# Patient Record
Sex: MALE | Race: WHITE | URBAN - METROPOLITAN AREA
[De-identification: names, ages, dates, MRNs, and addresses within clinical notes are randomized per-mention and may not be internally consistent; named-entity substitution may affect disease eponyms.]

---

## 2019-02-17 ENCOUNTER — EMERGENCY (EMERGENCY)
Facility: HOSPITAL | Age: 24
LOS: 1 days | Discharge: HOME | End: 2019-02-17
Admitting: EMERGENCY MEDICINE
Payer: MEDICAID

## 2019-02-17 VITALS
TEMPERATURE: 99 F | DIASTOLIC BLOOD PRESSURE: 83 MMHG | OXYGEN SATURATION: 100 % | SYSTOLIC BLOOD PRESSURE: 139 MMHG | HEART RATE: 95 BPM | RESPIRATION RATE: 16 BRPM

## 2019-02-17 PROCEDURE — 99282 EMERGENCY DEPT VISIT SF MDM: CPT

## 2019-02-17 RX ORDER — TETANUS TOXOID, REDUCED DIPHTHERIA TOXOID AND ACELLULAR PERTUSSIS VACCINE, ADSORBED 5; 2.5; 8; 8; 2.5 [IU]/.5ML; [IU]/.5ML; UG/.5ML; UG/.5ML; UG/.5ML
0.5 SUSPENSION INTRAMUSCULAR ONCE
Qty: 0 | Refills: 0 | Status: DISCONTINUED | OUTPATIENT
Start: 2019-02-17 | End: 2019-02-17

## 2019-02-17 NOTE — ED PROVIDER NOTE - CLINICAL SUMMARY MEDICAL DECISION MAKING FREE TEXT BOX
22 yo M from group home here for laceration. well appearing and acting at baseline. superficial laceration. no sutures indicated at this time, wound irrigated and closed with dermabond. F/u PCP. adacel updated. 22 yo M from group home here for laceration. well appearing and acting at baseline. superficial laceration. staff member reports that if sutures are needed mother may want plastics to perform and would like to speak with provider however is OK with ER provider performing dermabond which is best for this type of wound. no sutures indicated at this time, wound irrigated and closed with dermabond. F/u PCP. adacel updated. 22 yo M from group home here for laceration. well appearing and acting at baseline. superficial laceration. staff member reports that if sutures are needed mother may want plastics to perform and would like to speak with provider however is OK with ER provider performing dermabond which is best for this type of wound. no sutures indicated at this time, wound irrigated and closed with dermabond. F/u PCP.

## 2019-02-17 NOTE — ED PROVIDER NOTE - OBJECTIVE STATEMENT
24 yo M here from group home for laceration. hit head on table and sustained a laceration. denies other injury or trauma. denies LOC. well appearing. no additional complaints. acting at baseline. denies fever chills cp sob weakness HA dizziness numbness tingling. History with help of patients aide. Unknown last tetanus. 24 yo M here from group home for laceration. hit head on table and sustained a laceration. denies other injury or trauma. denies LOC. well appearing. no additional complaints. acting at baseline. denies fever chills cp sob weakness HA dizziness numbness tingling. History with help of patients aide. staff reached out to obtain vaccine records and patient is UTD with tetanus.

## 2019-02-17 NOTE — ED ADULT TRIAGE NOTE - CHIEF COMPLAINT QUOTE
Pt brought in by group home staff, pt hit his head on a table. Pt noted to have Lac on his forehead. denies use of blood thinners.

## 2019-02-17 NOTE — ED PROVIDER NOTE - SKIN, MLM
Skin normal color for race, warm, dry and intact. No evidence of rash. + 1.5 cm VERY superficial laceration to front scalp.

## 2019-03-18 PROBLEM — Z00.00 ENCOUNTER FOR PREVENTIVE HEALTH EXAMINATION: Status: ACTIVE | Noted: 2019-03-18

## 2019-03-21 PROBLEM — M79.641 PAIN IN BOTH HANDS: Status: ACTIVE | Noted: 2019-03-21

## 2019-03-21 PROBLEM — M79.642 PAIN OF LEFT HAND: Status: ACTIVE | Noted: 2019-03-21

## 2019-03-27 ENCOUNTER — APPOINTMENT (OUTPATIENT)
Dept: ORTHOPEDIC SURGERY | Facility: HOSPITAL | Age: 24
End: 2019-03-27

## 2019-03-27 ENCOUNTER — OUTPATIENT (OUTPATIENT)
Dept: OUTPATIENT SERVICES | Facility: HOSPITAL | Age: 24
LOS: 1 days | End: 2019-03-27

## 2019-03-27 VITALS
DIASTOLIC BLOOD PRESSURE: 81 MMHG | WEIGHT: 208.45 LBS | HEART RATE: 99 BPM | HEIGHT: 72 IN | SYSTOLIC BLOOD PRESSURE: 118 MMHG | BODY MASS INDEX: 28.23 KG/M2

## 2019-03-27 DIAGNOSIS — M79.641 PAIN IN RIGHT HAND: ICD-10-CM

## 2019-03-27 DIAGNOSIS — M79.642 PAIN IN RIGHT HAND: ICD-10-CM

## 2019-03-27 DIAGNOSIS — M79.642 PAIN IN LEFT HAND: ICD-10-CM

## 2019-04-01 DIAGNOSIS — M20.022 BOUTONNIERE DEFORMITY OF LEFT FINGER(S): ICD-10-CM

## 2019-04-17 ENCOUNTER — EMERGENCY (EMERGENCY)
Facility: HOSPITAL | Age: 24
LOS: 1 days | Discharge: HOME | End: 2019-04-17
Attending: EMERGENCY MEDICINE
Payer: MEDICAID

## 2019-04-17 VITALS
OXYGEN SATURATION: 99 % | RESPIRATION RATE: 18 BRPM | DIASTOLIC BLOOD PRESSURE: 70 MMHG | SYSTOLIC BLOOD PRESSURE: 116 MMHG | TEMPERATURE: 98 F | HEART RATE: 110 BPM

## 2019-04-17 VITALS
RESPIRATION RATE: 20 BRPM | OXYGEN SATURATION: 98 % | DIASTOLIC BLOOD PRESSURE: 74 MMHG | HEART RATE: 91 BPM | SYSTOLIC BLOOD PRESSURE: 111 MMHG | TEMPERATURE: 98 F

## 2019-04-17 PROCEDURE — 90715 TDAP VACCINE 7 YRS/> IM: CPT

## 2019-04-17 PROCEDURE — 90471 IMMUNIZATION ADMIN: CPT

## 2019-04-17 PROCEDURE — 99283 EMERGENCY DEPT VISIT LOW MDM: CPT | Mod: 25

## 2019-04-17 PROCEDURE — 12011 RPR F/E/E/N/L/M 2.5 CM/<: CPT

## 2019-04-17 RX ORDER — TETANUS TOXOID, REDUCED DIPHTHERIA TOXOID AND ACELLULAR PERTUSSIS VACCINE, ADSORBED 5; 2.5; 8; 8; 2.5 [IU]/.5ML; [IU]/.5ML; UG/.5ML; UG/.5ML; UG/.5ML
0.5 SUSPENSION INTRAMUSCULAR ONCE
Qty: 0 | Refills: 0 | Status: COMPLETED | OUTPATIENT
Start: 2019-04-17 | End: 2019-04-17

## 2019-04-17 RX ADMIN — TETANUS TOXOID, REDUCED DIPHTHERIA TOXOID AND ACELLULAR PERTUSSIS VACCINE, ADSORBED 0.5 MILLILITER(S): 5; 2.5; 8; 8; 2.5 SUSPENSION INTRAMUSCULAR at 22:06

## 2019-04-17 NOTE — ED PROVIDER NOTE - NSFOLLOWUPINSTRUCTIONS_ED_ALL_ED_FT
Your wound was repaired with skin adhesive. The skin adhesive will dissolve on its own over the next 1-2 weeks.    Keep the wound dry for 24 hours, then after this you may let water run over the wound but do not scrub it. Do not apply any ointment to the wound. Observe for signs of infection including spreading redness around the wound, fevers (temperature over 101F), or discharge of pus from the area. Return to the emergency department if these occur    Return to the ED for any behavioral changes, repetitive vomiting or any other concerns.

## 2019-04-17 NOTE — ED CLERICAL - NS ED CLERK NOTE PRE-ARRIVAL INFORMATION; ADDITIONAL PRE-ARRIVAL INFORMATION
CC/Reason For referral: Head Trauma, Laceration over right eyebrow. Pt is autistic and non verbal  Preferred Consultant(if applicable): Plastic Surgery  Who admits for you (if needed): N/A  Do you have documents you would like to fax over? Yes, documents sent to ER  Would you still like to speak to an ED attending? Yes, transferred down to Red MD    Patients Devang rodriguez [phone] is the health care proxy and requests to be kept up to date with the patients status

## 2019-04-17 NOTE — ED ADULT NURSE REASSESSMENT NOTE - NS ED NURSE REASSESS COMMENT FT1
Pt resting on stretcher, in no acute distress, vital signs stable. D/C instructions with facility staff. Okay to D/C as per Dr Covarrubias.

## 2019-04-17 NOTE — ED PROVIDER NOTE - PROGRESS NOTE DETAILS
Attending MD Covarrubias: Dr. Shankar of plastics has seen patient, feels laceration does not need suture repair, skin adhesive appropriate. Will close wound with dermabond, update tetanus

## 2019-04-17 NOTE — ED ADULT NURSE NOTE - NSIMPLEMENTINTERV_GEN_ALL_ED
Implemented All Universal Safety Interventions:  [place] to call system. Call bell, personal items and telephone within reach. Instruct patient to call for assistance. Room bathroom lighting operational. Non-slip footwear when patient is off stretcher. Physically safe environment: no spills, clutter or unnecessary equipment. Stretcher in lowest position, wheels locked, appropriate side rails in place.
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
## 2019-04-17 NOTE — ED ADULT NURSE NOTE - OBJECTIVE STATEMENT
24 y/o male, with hx of behavioral problems, "might be autism" a/p rep from group home, brought to ED after referral from  for lac of right eyebrow. Group home staff reports pt had an outburst, struck head on wall.  referred to ED to have lac sutured by plastics as per pt parent request. A/P group home staff, pt is acting as usual. Present with laceration dressed with bandage of right eyebrow, waiting to remove until provider in room to decrease agitation of pt. No LOC, no other complaints, no other injuries. Pt currently calm, resting on stretcher, watching television, tapping on siderails of stretcher with fingers making music. Rep from group home at bedside. Stretcher locked/lowered.

## 2019-04-17 NOTE — ED PROVIDER NOTE - PHYSICAL EXAMINATION
Attending MD Covarrubias:    Gen:  awake and alert, follows commands, verbalizes "yes" and "no" occasionally  Neck: supple, no swelling, trachea midline, nontender spine  Head: no scalp trauma, ~1cm partial thickness laceration through middle of right eyebrow, nontender facial bones, EOMI b/l, PERRL b/l  CV: heart with reg rhythm, no obvious murmur appreciated   Resp: CTAB, breathing comfortably  Abd: soft, NT, ND  Extremities: extremities warm to the touch, no peripheral edema   Msk: no extremity deformities or bony tenderness  Pysch: follows commands  Neuro: moves all extremities spontaneously, no gross motor or sensory deficits

## 2019-04-17 NOTE — CONSULT NOTE ADULT - SUBJECTIVE AND OBJECTIVE BOX
See dictated note.  Discussed w pt's mom and Dr Covarrubias.  Plan: As per Dr Covarrubias wound to be glued by him.

## 2019-04-17 NOTE — ED PROVIDER NOTE - CLINICAL SUMMARY MEDICAL DECISION MAKING FREE TEXT BOX
Attending MD Covarrubias: 23M with autism presenting from group home with right eyebrow laceration, patient struck his head on a wall 5 hours PTA. Nonfocal neuro exam, at baseline mental status, do not suspect ICH, [place] head CT negative so no indication for CT head. Patient's mother requesting plastic surgery for repair of right eyebrow laceration, will contact for primary repair

## 2019-04-17 NOTE — ED PROVIDER NOTE - OBJECTIVE STATEMENT
23M with autism presenting from group home with facial laceration and head injury. History obtained from group home representative at bedside as patient unable to provide history due to autism. Patient per report struck his head on a wall due to behavioral outburst, no LOC reported. Patient has been behaving at baseline per staff, no emesis. The patient was seen at an urgent care center and referred to ED for further evaluation. Unknown last tetanus vaccination.

## 2019-04-21 NOTE — ED PROCEDURE NOTE - CPROC ED INFORMED CONSENT1
Benefits, risks, and possible complications of procedure explained to patient/caregiver who verbalized understanding and gave verbal consent./phone consent obtained from mother

## 2019-04-24 ENCOUNTER — APPOINTMENT (OUTPATIENT)
Dept: ORTHOPEDIC SURGERY | Facility: HOSPITAL | Age: 24
End: 2019-04-24

## 2019-04-29 ENCOUNTER — APPOINTMENT (OUTPATIENT)
Dept: ORTHOPEDIC SURGERY | Facility: CLINIC | Age: 24
End: 2019-04-29

## 2019-05-03 PROBLEM — F84.0 AUTISTIC DISORDER: Chronic | Status: ACTIVE | Noted: 2019-04-21

## 2019-05-08 ENCOUNTER — OUTPATIENT (OUTPATIENT)
Dept: OUTPATIENT SERVICES | Facility: HOSPITAL | Age: 24
LOS: 1 days | End: 2019-05-08

## 2019-05-08 ENCOUNTER — APPOINTMENT (OUTPATIENT)
Dept: ORTHOPEDIC SURGERY | Facility: HOSPITAL | Age: 24
End: 2019-05-08

## 2019-05-08 VITALS
HEIGHT: 73 IN | DIASTOLIC BLOOD PRESSURE: 85 MMHG | HEART RATE: 97 BPM | BODY MASS INDEX: 26.11 KG/M2 | SYSTOLIC BLOOD PRESSURE: 144 MMHG | WEIGHT: 197 LBS

## 2019-05-08 DIAGNOSIS — S62.91XA UNSPECIFIED FRACTURE OF RIGHT WRIST AND HAND, INITIAL ENCOUNTER FOR CLOSED FRACTURE: ICD-10-CM

## 2019-05-09 DIAGNOSIS — S62.352A NONDISPLACED FRACTURE OF SHAFT OF THIRD METACARPAL BONE, RIGHT HAND, INITIAL ENCOUNTER FOR CLOSED FRACTURE: ICD-10-CM

## 2019-05-09 DIAGNOSIS — M20.022 BOUTONNIERE DEFORMITY OF LEFT FINGER(S): ICD-10-CM

## 2019-05-15 ENCOUNTER — OTHER (OUTPATIENT)
Age: 24
End: 2019-05-15

## 2019-05-15 ENCOUNTER — APPOINTMENT (OUTPATIENT)
Dept: ORTHOPEDIC SURGERY | Facility: HOSPITAL | Age: 24
End: 2019-05-15

## 2019-05-15 ENCOUNTER — APPOINTMENT (OUTPATIENT)
Dept: RADIOLOGY | Facility: HOSPITAL | Age: 24
End: 2019-05-15

## 2019-05-15 ENCOUNTER — OUTPATIENT (OUTPATIENT)
Dept: OUTPATIENT SERVICES | Facility: HOSPITAL | Age: 24
LOS: 1 days | End: 2019-05-15
Payer: MEDICAID

## 2019-05-15 VITALS
DIASTOLIC BLOOD PRESSURE: 98 MMHG | WEIGHT: 199 LBS | HEART RATE: 91 BPM | BODY MASS INDEX: 26.37 KG/M2 | SYSTOLIC BLOOD PRESSURE: 123 MMHG | HEIGHT: 73 IN

## 2019-05-15 PROCEDURE — 73130 X-RAY EXAM OF HAND: CPT | Mod: 26,RT

## 2019-05-16 DIAGNOSIS — S62.352D NONDISPLACED FRACTURE OF SHAFT OF THIRD METACARPAL BONE, RIGHT HAND, SUBSEQUENT ENCOUNTER FOR FRACTURE WITH ROUTINE HEALING: ICD-10-CM

## 2019-05-16 DIAGNOSIS — M20.022 BOUTONNIERE DEFORMITY OF LEFT FINGER(S): ICD-10-CM

## 2019-05-19 ENCOUNTER — EMERGENCY (EMERGENCY)
Facility: HOSPITAL | Age: 24
LOS: 1 days | Discharge: HOME | End: 2019-05-19
Attending: EMERGENCY MEDICINE | Admitting: EMERGENCY MEDICINE
Payer: MEDICAID

## 2019-05-19 VITALS
RESPIRATION RATE: 16 BRPM | OXYGEN SATURATION: 100 % | TEMPERATURE: 98 F | DIASTOLIC BLOOD PRESSURE: 72 MMHG | HEART RATE: 88 BPM | SYSTOLIC BLOOD PRESSURE: 118 MMHG

## 2019-05-19 VITALS
TEMPERATURE: 98 F | OXYGEN SATURATION: 98 % | RESPIRATION RATE: 20 BRPM | SYSTOLIC BLOOD PRESSURE: 136 MMHG | HEART RATE: 135 BPM | DIASTOLIC BLOOD PRESSURE: 89 MMHG

## 2019-05-19 PROCEDURE — 73130 X-RAY EXAM OF HAND: CPT | Mod: 26,RT,76

## 2019-05-19 PROCEDURE — 99283 EMERGENCY DEPT VISIT LOW MDM: CPT

## 2019-05-19 NOTE — CONSULT NOTE ADULT - SUBJECTIVE AND OBJECTIVE BOX
HPI: 23y year old Male w/ hx of autism, well known to orthopaedic service approximately 2 weeks after sustaining a R 3rd metacarpal s/p unwitnessed trauma. Patient was seen in clinic with Dr. Lee and was placed into a short arm gutter cast by myself since he had been able to brake apart most of the cast that was put on him the week previously. He is brought back in today because he "gnawed off" the top of his cast.    PMH:  Autism    PSH:  No significant past surgical history    Allergies:  No Known Allergies    O:  T(C): 36.8 (05-19-19 @ 15:44), Max: 36.8 (05-19-19 @ 15:44)  HR: 135 (05-19-19 @ 15:44) (135 - 135)  BP: 136/89 (05-19-19 @ 15:44) (136/89 - 136/89)  RR: 20 (05-19-19 @ 15:44) (20 - 20)  SpO2: 98% (05-19-19 @ 15:44) (98% - 98%)    Gen  RUE  skin intact  top of cast missing, bottom part dangling around forearm  remainder of cast removed with cast saw  minimally TTP over 3rd MC, no apparent deformity  AIN/PIN/U Intact  SILT M/U/R  Radial pulse palpable, WWP distally    Imaging: XR of the R hand show maintained alignment of 3rd MC fx    Patient was placed in new short arm gutter cast. Post reduction x-rays reviewed with maintained alignment.    A/P 23y year old man with subacute R 3rd MC fx, gnawed off his cast and placed in new one today    Pain Control  NWB RUE  Sling for comfort  F/u as outpatient with Dr. Lee in 2.5 weeks as planned    Patient discussed with Dr. Jesus Rod MD

## 2019-05-19 NOTE — ED PROVIDER NOTE - PROGRESS NOTE DETAILS
post splint xr reviewed by ortho, stable for dc w/ f/u in ortho clinic -Sebastian, pgy2 Pt. with 3rd MC fx, repeat immobilization in ED, post splint xr reviewed by ortho, stable for dc w/ f/u in ortho clinic -Sebastian, pgy2

## 2019-05-19 NOTE — ED PROVIDER NOTE - OBJECTIVE STATEMENT
23M hx autism/intellectual disability p/w bilateral hand pain. Allegedly pt smacks extremities against objects at baseline, often hands/fingers, partakes in these behaviors more when agitated per aid at bedside pt was dx'd w/ R 4th digit fx in last week, digit was placed in a splint but since then pt has knocked the splint off in doing his usual thrashing behavior. On exam pt is barely verbal but does endorse bilateral finger/hand pain. Not acutely agitated. 23M hx autism/intellectual disability p/w bilateral hand pain. Allegedly pt smacks extremities against objects at baseline, often hands/fingers, partakes in these behaviors more when agitated per aid at bedside pt was dx'd w/ R 3rd digit fx in last week, digit was placed in a splint but since then pt has knocked the splint off in doing his usual thrashing behavior. On exam pt is barely verbal but does endorse bilateral finger/hand pain. Not acutely agitated.

## 2019-05-19 NOTE — ED PROVIDER NOTE - ATTENDING CONTRIBUTION TO CARE
Seen and examined, pt. NAD at time of exam, alert, smiling and baseline mental status per aide. Pt. with recent dx of finger fx and bit into casting material, also banged both hands into chair/bed/wall as he sometimes does. Pt. not currently c/o pain but reportedly was holding R hand as if it were injured and now may have been doing the same with L hand. Per aide parent was concerned re: bilat hand injuries. No other potential injuries or c/o reported. Clear lungs, heart reg, abd soft, NT to palp, RUE with partial cast material, full circumference to distal forearm, damaged distally, hand and fingers exposed, no edema/ecchymosis and full ROM. LUE ranges all and NT to palp.

## 2019-05-19 NOTE — ED ADULT NURSE NOTE - OBJECTIVE STATEMENT
Coming from group home with staff at bedside. Pt. c/o pain to right 4th finger. As per staff, pt. constantly moving hands around hitting things. xray done prior to arrival showing fx to right 4th digit. Hand was casted but pt. took it off. Sent for recasting and xray of left hand. As per mother, pt. has been complaining of pain to left hand. Denies any pain right now. Able to move and bend all fingers with full sensation. Pt. behavior normal for him. MD at bedside for eval. Awaiting xrays. Will continue to monitor.

## 2019-05-19 NOTE — ED ADULT NURSE NOTE - NSIMPLEMENTINTERV_GEN_ALL_ED
Implemented All Universal Safety Interventions:  [place] to call system. Call bell, personal items and telephone within reach. Instruct patient to call for assistance. Room bathroom lighting operational. Non-slip footwear when patient is off stretcher. Physically safe environment: no spills, clutter or unnecessary equipment. Stretcher in lowest position, wheels locked, appropriate side rails in place.

## 2019-05-19 NOTE — ED PROVIDER NOTE - CLINICAL SUMMARY MEDICAL DECISION MAKING FREE TEXT BOX
23M hx austism p/w bilateral hand pain in setting of recent R fourth digit fracture. Cooperative on exam with no gross deforrmities but digital tenderness to palpation bilaterally. Plan - bilateral hand xrays and re-splinting, will sedate if pt becomes agitated.

## 2019-05-19 NOTE — ED ADULT TRIAGE NOTE - CHIEF COMPLAINT QUOTE
Pt autistic from group home c/o finger injury. Pt noted to have half cast on RT arm. As per aid pt bangs fingers and injured finger on rt hand. No visible bruising or swelling noted to fingers. As per aid pt's mother would like x-ray of left arm as well because of possible injury. Pt able to move both extremities.

## 2019-05-27 ENCOUNTER — EMERGENCY (EMERGENCY)
Facility: HOSPITAL | Age: 24
LOS: 1 days | Discharge: HOME | End: 2019-05-27
Admitting: EMERGENCY MEDICINE
Payer: MEDICAID

## 2019-05-27 VITALS
SYSTOLIC BLOOD PRESSURE: 110 MMHG | HEART RATE: 94 BPM | TEMPERATURE: 99 F | DIASTOLIC BLOOD PRESSURE: 61 MMHG | RESPIRATION RATE: 18 BRPM | OXYGEN SATURATION: 99 %

## 2019-05-27 PROCEDURE — 99283 EMERGENCY DEPT VISIT LOW MDM: CPT

## 2019-05-27 RX ORDER — HYDROCORTISONE 1 %
1 OINTMENT (GRAM) TOPICAL
Qty: 1 | Refills: 0
Start: 2019-05-27 | End: 2019-06-02

## 2019-05-27 RX ORDER — HYDROCORTISONE 1 %
1 OINTMENT (GRAM) TOPICAL
Qty: 1 | Refills: 0
Start: 2019-05-27

## 2019-05-27 NOTE — ED PROVIDER NOTE - NSFOLLOWUPINSTRUCTIONS_ED_ALL_ED_FT
Please apply hydrocortisone cream to affected areas as prescribed.  Please follow up in orthopedic clinic in 1-2 days.  If any new, worsened or concerning symptoms, please return to the ED immediately.

## 2019-05-27 NOTE — ED PROVIDER NOTE - PHYSICAL EXAMINATION
Right forearm/arm: Short arm gutter cast noted in place, no forearm or hand swelling noted. Cast loose, I am able to slide 3 of my fingers under it, right at the edge where the cast ends distal forearm there is an area of erythema, dry and scaly which does not extend beneath the rest of the casr, no warmth, radial pulse 2+, cap refill <2sec, sensation to light touch intact, pt is able to move all fingers without difficulties

## 2019-05-27 NOTE — ED PROVIDER NOTE - OBJECTIVE STATEMENT
23 year old male with PMH of Autism presents to the ED accompanied by group home staff member complaining of skin redness around the cast for a week. Pt has a short arm gutter cast in place for nearly 3 weeks for 3rd metacarpal fracture. Pt nodded to questions about pain, weakness, numbness or tingling sensation. No fever or chills.

## 2019-05-27 NOTE — ED PROVIDER NOTE - CLINICAL SUMMARY MEDICAL DECISION MAKING FREE TEXT BOX
23 year old male with contact cellulitis of the right forearm due to short arm gutter cast in place; cast is loose, neurosensory exam intact; no clinical evidence of acute compartment synd, cellulitis, or nec fasc. will Rx Steroids and advise follow up in ortho clinic in 1-2 days. Return precautions.

## 2019-05-30 ENCOUNTER — EMERGENCY (EMERGENCY)
Facility: HOSPITAL | Age: 24
LOS: 1 days | Discharge: HOME | End: 2019-05-30
Attending: EMERGENCY MEDICINE | Admitting: EMERGENCY MEDICINE
Payer: MEDICAID

## 2019-05-30 VITALS
TEMPERATURE: 99 F | RESPIRATION RATE: 18 BRPM | HEART RATE: 97 BPM | DIASTOLIC BLOOD PRESSURE: 88 MMHG | OXYGEN SATURATION: 97 % | SYSTOLIC BLOOD PRESSURE: 114 MMHG

## 2019-05-30 PROCEDURE — 73130 X-RAY EXAM OF HAND: CPT | Mod: 26,RT

## 2019-05-30 PROCEDURE — 99283 EMERGENCY DEPT VISIT LOW MDM: CPT

## 2019-05-30 RX ORDER — CLONAZEPAM 1 MG
1 TABLET ORAL ONCE
Refills: 0 | Status: DISCONTINUED | OUTPATIENT
Start: 2019-05-30 | End: 2019-05-30

## 2019-05-30 RX ORDER — CLONAZEPAM 1 MG
0.5 TABLET ORAL ONCE
Refills: 0 | Status: DISCONTINUED | OUTPATIENT
Start: 2019-05-30 | End: 2019-05-30

## 2019-05-30 RX ADMIN — Medication 1 MILLIGRAM(S): at 22:37

## 2019-05-30 RX ADMIN — Medication 1 MILLIGRAM(S): at 20:55

## 2019-05-30 RX ADMIN — Medication 0.5 MILLIGRAM(S): at 20:55

## 2019-05-30 NOTE — ED PROVIDER NOTE - PROGRESS NOTE DETAILS
pt becoming more agitated. repaged ortho to obtain ETA for re-casting. ortho resident says he's coming after seeing pt in [place] next [place]OTT [place]: Pt seen by ortho, re-casted, post procedure imaging fine per ortho, can d/c has appointment for Wednesday 6/05 for ortho clinic.

## 2019-05-30 NOTE — ED PROVIDER NOTE - NSFOLLOWUPINSTRUCTIONS_ED_ALL_ED_FT
Follow up in ortho clinic on 6/05, you have an appointment scheduled  Keep cast in place  Return to the ER with any worsening or concerning symptoms, pain, swelling, redness, numbness/tingling or any other concerns.

## 2019-05-30 NOTE — ED PROVIDER NOTE - ATTENDING CONTRIBUTION TO CARE
Patient is a 24 yo M with history of autism here for evaluation of his right hand. Patient has a hx of right hand 3rd MCP fracture s/p cast ~ 3 weeks ago, here for a 2nd time for reassessment because he tries to get his cast off and bangs his hand. Patient states he has pain in his right hand. Per caregiver, no other issues including fevers, vomiting. Patient is supposed to have his cast off next week.    VS noted  Gen. no acute distress, Non toxic   HEENT: EOMI, mmm  Lungs: CTAB/L no C/ W /R   CVS: RRR   Abd; Soft non tender, non distended   Ext: right hand: partial cast has been chipped away at, fingers visible, normal cap refill, moving fingers  Skin: no rash  Neuro awake, alert, non focal  a/p: right 3rd MCP fx - here for cast reassessment. Will XR and consult ortho - as needs to be recasted.   - Cecil HONEYCUTT

## 2019-05-30 NOTE — ED PROVIDER NOTE - CLINICAL SUMMARY MEDICAL DECISION MAKING FREE TEXT BOX
23M hx autism & intellectual disability p/w broken R short arm gutter cast placed by orthopedics on 5/19. Hand not acutely swollen compared to L. WIll re-xray and call ortho once films back.

## 2019-05-30 NOTE — ED PROVIDER NOTE - CARE PLAN
Principal Discharge DX:	Metacarpal bone fracture  Assessment and plan of treatment:	right hand 3rd metacarpal bone fracture, closed, recasted.

## 2019-05-30 NOTE — ED ADULT TRIAGE NOTE - CHIEF COMPLAINT QUOTE
Patient coming from Group Home ([facility]) with staff member. RN at facility requesting to have right arm cast "re-casted." Patient able to follow simple commands. Patient coming from [facility] ([facility]) with staff member. RN at facility requesting to have right arm cast "re-casted." Patient able to follow simple commands. PMH Autism

## 2019-05-30 NOTE — ED PROVIDER NOTE - OBJECTIVE STATEMENT
23M hx autism/intellectual disability p/w because "he needs to be re-casted" 2/2 pt picking at and banging his casted R hand which is known to have a 3rd MCP fx. Allegedly pt smacks extremities against objects at baseline, often hands/fingers, partakes in these behaviors more when agitated per aid at bedside. pt has knocked the splint off in doing his usual thrashing behavior. On exam pt is barely verbal but does endorse bilateral finger/hand pain. Not acutely agitated.

## 2019-05-31 PROCEDURE — 73130 X-RAY EXAM OF HAND: CPT | Mod: 26,RT

## 2019-05-31 PROCEDURE — 73090 X-RAY EXAM OF FOREARM: CPT | Mod: 26,RT

## 2019-05-31 NOTE — CONSULT NOTE ADULT - SUBJECTIVE AND OBJECTIVE BOX
CC: Bit of R hand cast  HPI: 23y year old Male w/ hx of autism, well known to orthopaedic service approximately 4 weeks after sustaining a R 3rd metacarpal fx s/p unwitnessed trauma. Patient was seen in clinic with Dr. Lee and was placed into a short arm gutter cast by myself, he broke/bit apart most of the nicolás and was recasted on 5/19. He is brought back in today because he again "gnawed off" the top of his cast.    Review of systems: As per HPI, otherwise negative.     PAST MEDICAL & SURGICAL HISTORY:  Autism  No significant past surgical history    Family History: FAMILY HISTORY:  Non-contributory    T(C): 37.1 (05-30-19 @ 17:15), Max: 37.1 (05-30-19 @ 17:15)  HR: 97 (05-30-19 @ 17:15) (97 - 97)  BP: 114/88 (05-30-19 @ 17:15) (114/88 - 114/88)  RR: 18 (05-30-19 @ 17:15) (18 - 18)  SpO2: 97% (05-30-19 @ 17:15) (97% - 97%)    Gen  RUE  skin intact  top of cast missing, bottom part around forearm  remainder of cast removed with cast saw  non TTP over 3rd MC, no apparent deformity  AIN/PIN/U Intact  SILT M/U/R  Radial pulse palpable, WWP distally    Imaging: XR of the R hand show maintained alignment of 3rd MC fx    Patient was placed in new short arm gutter cast. Post reduction x-rays reviewed with maintained alignment.    A/P 23y year old man with subacute R 3rd MC fx, gnawed off his cast and placed in new one today    Pain Control  NWB RUE  Sling for comfort  F/u as outpatient with Dr. Lee next Wednesday as planned    Patient discussed with Dr. Jesus Rod MD

## 2019-06-05 ENCOUNTER — APPOINTMENT (OUTPATIENT)
Dept: ORTHOPEDIC SURGERY | Facility: HOSPITAL | Age: 24
End: 2019-06-05

## 2019-06-05 ENCOUNTER — OUTPATIENT (OUTPATIENT)
Dept: OUTPATIENT SERVICES | Facility: HOSPITAL | Age: 24
LOS: 1 days | End: 2019-06-05

## 2019-06-05 VITALS — HEART RATE: 108 BPM | DIASTOLIC BLOOD PRESSURE: 60 MMHG | SYSTOLIC BLOOD PRESSURE: 140 MMHG | WEIGHT: 192 LBS

## 2019-06-11 ENCOUNTER — APPOINTMENT (OUTPATIENT)
Dept: ORTHOPEDIC SURGERY | Facility: CLINIC | Age: 24
End: 2019-06-11
Payer: MEDICAID

## 2019-06-11 VITALS
HEART RATE: 88 BPM | SYSTOLIC BLOOD PRESSURE: 108 MMHG | DIASTOLIC BLOOD PRESSURE: 70 MMHG | BODY MASS INDEX: 25.45 KG/M2 | HEIGHT: 73 IN | WEIGHT: 192 LBS

## 2019-06-11 DIAGNOSIS — S62.352D NONDISPLACED FRACTURE OF SHAFT OF THIRD METACARPAL BONE, RIGHT HAND, SUBSEQUENT ENCOUNTER FOR FRACTURE WITH ROUTINE HEALING: ICD-10-CM

## 2019-06-11 DIAGNOSIS — Z78.9 OTHER SPECIFIED HEALTH STATUS: ICD-10-CM

## 2019-06-11 DIAGNOSIS — M20.022 BOUTONNIERE DEFORMITY OF LEFT FINGER(S): ICD-10-CM

## 2019-06-11 DIAGNOSIS — Z86.59 PERSONAL HISTORY OF OTHER MENTAL AND BEHAVIORAL DISORDERS: ICD-10-CM

## 2019-06-11 PROCEDURE — 99214 OFFICE O/P EST MOD 30 MIN: CPT

## 2019-06-11 RX ORDER — CLONAZEPAM 1 MG/1
1 TABLET ORAL
Refills: 0 | Status: ACTIVE | COMMUNITY

## 2019-07-01 ENCOUNTER — EMERGENCY (EMERGENCY)
Facility: HOSPITAL | Age: 24
LOS: 1 days | Discharge: HOME | End: 2019-07-01
Attending: EMERGENCY MEDICINE | Admitting: EMERGENCY MEDICINE
Payer: MEDICAID

## 2019-07-01 VITALS
DIASTOLIC BLOOD PRESSURE: 71 MMHG | TEMPERATURE: 99 F | HEART RATE: 95 BPM | SYSTOLIC BLOOD PRESSURE: 141 MMHG | RESPIRATION RATE: 16 BRPM | OXYGEN SATURATION: 98 %

## 2019-07-01 PROCEDURE — 99282 EMERGENCY DEPT VISIT SF MDM: CPT

## 2019-07-01 NOTE — ED PROVIDER NOTE - CLINICAL SUMMARY MEDICAL DECISION MAKING FREE TEXT BOX
24 year-old male with history of autism/intellectual disability with baseline trashing behavior; 1.5cm superficial laceration - not gaping.  No need for closure at this time.  Bacitracin and outpatient follow-up.
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
## 2019-07-01 NOTE — ED PROVIDER NOTE - PHYSICAL EXAMINATION
*Gen: NAD, inappropriate for situation (baseline)  *HEENT: NC, small 1-2 cm hematoma on the superior frontal aspect of head, MMM, airway patent, trachea midline  *CV: RRR, S1/S2 present, no murmurs/rubs  *Resp: no respiratory distress, LCTAB, no wheezing/rales  *Abd: non-distended, soft N/Tx4, no guarding or rigidity  *Neuro: no focal neuro deficits, moving all limbs appropriately  *Extremities: no gross deformity  *Skin: no rashes, superficial 1.5cm laceration on superior frontal aspect of head - not gaping  ~ Dave Johnson M.D.
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
## 2019-07-01 NOTE — ED PROVIDER NOTE - NSFOLLOWUPINSTRUCTIONS_ED_ALL_ED_FT
Follow-up with your Primary Care Physician within 2 - 5 days or as needed.  Please return to the Emergency Department immediately for any new, worsening or concerning symptoms.    Can use Tylenol or Ibuprofen as per package directions for pain - use only as needed.  These are over-the-counter medications - please respect the warnings on the label.     Can apply antibiotic ointment over the wound daily.  Keep wound clean and dry wound, apply a thin layer of antibiotic ointment and check for any signs/symptoms of infection (drainage from site, redness, red streaking, and/or new, worsening or concerning symptoms).

## 2019-07-17 ENCOUNTER — APPOINTMENT (OUTPATIENT)
Dept: ORTHOPEDIC SURGERY | Facility: HOSPITAL | Age: 24
End: 2019-07-17

## 2019-09-27 ENCOUNTER — EMERGENCY (EMERGENCY)
Facility: HOSPITAL | Age: 24
LOS: 1 days | Discharge: HOME | End: 2019-09-27
Attending: EMERGENCY MEDICINE | Admitting: EMERGENCY MEDICINE
Payer: MEDICAID

## 2019-09-27 VITALS
OXYGEN SATURATION: 100 % | RESPIRATION RATE: 16 BRPM | SYSTOLIC BLOOD PRESSURE: 111 MMHG | DIASTOLIC BLOOD PRESSURE: 62 MMHG | HEART RATE: 98 BPM | TEMPERATURE: 98 F

## 2019-09-27 VITALS
HEART RATE: 82 BPM | SYSTOLIC BLOOD PRESSURE: 115 MMHG | OXYGEN SATURATION: 100 % | TEMPERATURE: 98 F | DIASTOLIC BLOOD PRESSURE: 58 MMHG | RESPIRATION RATE: 18 BRPM

## 2019-09-27 LAB
ALBUMIN SERPL ELPH-MCNC: 4.3 G/DL — SIGNIFICANT CHANGE UP (ref 3.3–5)
ALP SERPL-CCNC: 60 U/L — SIGNIFICANT CHANGE UP (ref 40–120)
ALT FLD-CCNC: 9 U/L — SIGNIFICANT CHANGE UP (ref 4–41)
ANION GAP SERPL CALC-SCNC: 14 MMO/L — SIGNIFICANT CHANGE UP (ref 7–14)
APAP SERPL-MCNC: < 15 UG/ML — LOW (ref 15–25)
AST SERPL-CCNC: 21 U/L — SIGNIFICANT CHANGE UP (ref 4–40)
BASOPHILS # BLD AUTO: 0.02 K/UL — SIGNIFICANT CHANGE UP (ref 0–0.2)
BASOPHILS NFR BLD AUTO: 0.3 % — SIGNIFICANT CHANGE UP (ref 0–2)
BILIRUB SERPL-MCNC: 0.5 MG/DL — SIGNIFICANT CHANGE UP (ref 0.2–1.2)
BUN SERPL-MCNC: 12 MG/DL — SIGNIFICANT CHANGE UP (ref 7–23)
CALCIUM SERPL-MCNC: 9.3 MG/DL — SIGNIFICANT CHANGE UP (ref 8.4–10.5)
CHLORIDE SERPL-SCNC: 104 MMOL/L — SIGNIFICANT CHANGE UP (ref 98–107)
CO2 SERPL-SCNC: 22 MMOL/L — SIGNIFICANT CHANGE UP (ref 22–31)
CREAT SERPL-MCNC: 1.08 MG/DL — SIGNIFICANT CHANGE UP (ref 0.5–1.3)
EOSINOPHIL # BLD AUTO: 0.05 K/UL — SIGNIFICANT CHANGE UP (ref 0–0.5)
EOSINOPHIL NFR BLD AUTO: 0.9 % — SIGNIFICANT CHANGE UP (ref 0–6)
ETHANOL BLD-MCNC: < 10 MG/DL — SIGNIFICANT CHANGE UP
GLUCOSE SERPL-MCNC: 69 MG/DL — LOW (ref 70–99)
HCT VFR BLD CALC: 48.7 % — SIGNIFICANT CHANGE UP (ref 39–50)
HGB BLD-MCNC: 15.8 G/DL — SIGNIFICANT CHANGE UP (ref 13–17)
IMM GRANULOCYTES NFR BLD AUTO: 0.3 % — SIGNIFICANT CHANGE UP (ref 0–1.5)
LYMPHOCYTES # BLD AUTO: 1.79 K/UL — SIGNIFICANT CHANGE UP (ref 1–3.3)
LYMPHOCYTES # BLD AUTO: 30.4 % — SIGNIFICANT CHANGE UP (ref 13–44)
MCHC RBC-ENTMCNC: 28.2 PG — SIGNIFICANT CHANGE UP (ref 27–34)
MCHC RBC-ENTMCNC: 32.4 % — SIGNIFICANT CHANGE UP (ref 32–36)
MCV RBC AUTO: 87 FL — SIGNIFICANT CHANGE UP (ref 80–100)
MONOCYTES # BLD AUTO: 0.67 K/UL — SIGNIFICANT CHANGE UP (ref 0–0.9)
MONOCYTES NFR BLD AUTO: 11.4 % — SIGNIFICANT CHANGE UP (ref 2–14)
NEUTROPHILS # BLD AUTO: 3.33 K/UL — SIGNIFICANT CHANGE UP (ref 1.8–7.4)
NEUTROPHILS NFR BLD AUTO: 56.7 % — SIGNIFICANT CHANGE UP (ref 43–77)
NRBC # FLD: 0 K/UL — SIGNIFICANT CHANGE UP (ref 0–0)
PLATELET # BLD AUTO: 174 K/UL — SIGNIFICANT CHANGE UP (ref 150–400)
PMV BLD: 12.1 FL — SIGNIFICANT CHANGE UP (ref 7–13)
POTASSIUM SERPL-MCNC: 4.2 MMOL/L — SIGNIFICANT CHANGE UP (ref 3.5–5.3)
POTASSIUM SERPL-SCNC: 4.2 MMOL/L — SIGNIFICANT CHANGE UP (ref 3.5–5.3)
PROT SERPL-MCNC: 7.5 G/DL — SIGNIFICANT CHANGE UP (ref 6–8.3)
RBC # BLD: 5.6 M/UL — SIGNIFICANT CHANGE UP (ref 4.2–5.8)
RBC # FLD: 12.8 % — SIGNIFICANT CHANGE UP (ref 10.3–14.5)
SALICYLATES SERPL-MCNC: < 5 MG/DL — LOW (ref 15–30)
SODIUM SERPL-SCNC: 140 MMOL/L — SIGNIFICANT CHANGE UP (ref 135–145)
WBC # BLD: 5.88 K/UL — SIGNIFICANT CHANGE UP (ref 3.8–10.5)
WBC # FLD AUTO: 5.88 K/UL — SIGNIFICANT CHANGE UP (ref 3.8–10.5)

## 2019-09-27 PROCEDURE — 99284 EMERGENCY DEPT VISIT MOD MDM: CPT

## 2019-09-27 NOTE — ED ADULT NURSE NOTE - OBJECTIVE STATEMENT
received pt A&Ox1 in no apparent distress at this time. #20g IVL to R FA, bloods drawn and sent to the lab. no s/s of infiltration noted at this time. staff and MD at bedside. pt calm and cooperative at this time. vss. cardiac monitor in place as per MD order. dispo pending.

## 2019-09-27 NOTE — ED PROVIDER NOTE - PATIENT PORTAL LINK FT
You can access the FollowMyHealth Patient Portal offered by [facility] by registering at the following website: [email or web address]. By joining [employer]’s FollowMyHealth portal, you will also be able to view your health information using other applications (apps) compatible with our system.

## 2019-09-27 NOTE — ED PROVIDER NOTE - OBJECTIVE STATEMENT
24y male with PMH of autism presenting with possible clonazepam overdose. History from aide, patient was found in med room at his group home with an empty bottle of clonazepam, patient stated both that he put them down the drain and that he had taken them. No other medications were noted to be missing. There were approximately 22 pills of 1mg clonazepam in the bottle. Possible ingestion occurred at approximately 6:30am. Aide states that patient appears to be at baseline. Patient answering yes to all questions, unable to get clear history from patient, when asked if he took the meds he states yes.

## 2019-09-27 NOTE — ED ADULT NURSE NOTE - CHIEF COMPLAINT QUOTE
patient brought in by  ([facility] ) stating that " patient went in to med room and got hold of Klonopin 1mg tablets and pour them down the sink at 6am and came in to get it checked for possible OD." patient has h/o Autism, fallows commands and states he put in the drain.

## 2019-09-27 NOTE — ED ADULT TRIAGE NOTE - CHIEF COMPLAINT QUOTE
patient brought in by  ([facility] ) stating that " patient went in to med room and got hold of Klonopin 1mg tablets and pour them down the sink at 6am and came in to get it checked for possible OD." patient has h/o Autism, fallows commands and states he put in the drain.

## 2019-09-27 NOTE — ED PROVIDER NOTE - NS ED MD DISPO DISCHARGE CCDA
Face to Face Supporting Documentation - Home Health    The encounter with this patient was in whole or in part the primary reason for home health admission.    Date of encounter:   Patient:                    MRN:                       YOB: 2019  Yuri De León  [id number]  1945     Home health to see patient for:  Skilled Nursing care for assessment, interventions & education, Medical social work consult, Home health aide, Physical Therapy evaluation and treatment and Occupational therapy evaluation and treatment    Skilled need for:  New Onset Medical Diagnosis Cellulitis R knee    Skilled nursing interventions to include:  Wound Care    Homebound status evidenced by:  Need the aid of supportive devices such as crutches, canes, wheelchairs or walkers. Leaving home requires a considerable and taxing effort. There is a normal inability to leave the home.    Community Physician to provide follow up care: Deng Corbin D.O.     Optional Interventions? No      I certify the face to face encounter for this home health care referral meets the CMS requirements and the encounter/clinical assessment with the patient was, in whole, or in part, for the medical condition(s) listed above, which is the primary reason for home health care. Based on my clinical findings: the service(s) are medically necessary, support the need for home health care, and the homebound criteria are met.  I certify that this patient has had a face to face encounter by myself.  Ramona Olsen D.O. - NPI: [id number]     Patient/Caregiver provided printed discharge information.

## 2019-09-27 NOTE — ED PROVIDER NOTE - ATTENDING CONTRIBUTION TO CARE
I performed a face-to-face evaluation of the patient and performed a history and physical examination. I agree with the history and physical examination.    Autism, on clonopin, found in med room of group home w/ empty clonopin bottle. States he didn’t take meds. Pt. is at baseline, but perhaps a bit less active. Maintaining airway, answering questions. Likely clonopin OD, without apparent significant complications. Plan: tox labs, obs vs. admit, tox consult.

## 2019-09-27 NOTE — ED PROVIDER NOTE - CLINICAL SUMMARY MEDICAL DECISION MAKING FREE TEXT BOX
Kristian: Autism, on clonopin, found in med room of group home w/ empty clonopin bottle. States he didn’t take meds. Pt. is at baseline, but perhaps a bit less active. Maintaining airway, answering questions. Likely clonopin OD, without apparent significant complications. Plan: tox labs, obs vs. admit, tox consult.

## 2019-09-27 NOTE — ED PROVIDER NOTE - NSFOLLOWUPINSTRUCTIONS_ED_ALL_ED_FT
You were evaluated today for a potential overdose. Your labs and vital sign were within normal limits. Continue to observe for signs of benzodiazapine overdose: excessive sleepiness, slowed breathing, or decreased alertness.    Return to the emergency department if you have new or worsening symptoms such as those listed above.   Continue taking your normal medication regimen.    Follow up with your regular doctor in the next 1-3 days.

## 2019-09-27 NOTE — ED PROVIDER NOTE - PHYSICAL EXAMINATION
Gen: AAOx3, non-toxic  Head: NCAT  HEENT: EOMI, oral mucosa moist, normal conjunctiva  Lung: CTAB, no respiratory distress, no wheezes/rhonchi/rales B/L  CV: RRR, no murmurs, rubs or gallops  Abd: soft, NTND, no guarding  MSK: no visible deformities  Neuro: No focal sensory or motor deficits, pupils ~4mm, PERRL  Skin: Warm, well perfused, no rash  ~Jeferson Cindy PGY2

## 2020-01-13 ENCOUNTER — EMERGENCY (EMERGENCY)
Facility: HOSPITAL | Age: 25
LOS: 1 days | Discharge: HOME | End: 2020-01-13
Admitting: EMERGENCY MEDICINE
Payer: MEDICAID

## 2020-01-13 VITALS
RESPIRATION RATE: 17 BRPM | TEMPERATURE: 99 F | HEART RATE: 100 BPM | OXYGEN SATURATION: 99 % | SYSTOLIC BLOOD PRESSURE: 119 MMHG | DIASTOLIC BLOOD PRESSURE: 61 MMHG

## 2020-01-13 PROCEDURE — 99284 EMERGENCY DEPT VISIT MOD MDM: CPT

## 2020-01-13 PROCEDURE — 70450 CT HEAD/BRAIN W/O DYE: CPT | Mod: 26

## 2020-01-13 NOTE — PROVIDER CONTACT NOTE (OTHER) - ASSESSMENT
per provider, Bryan CHAVEZ, alden is cleared and is able to return to their previous residence, family residence and essential [place] T# [phone]   .  sjai has spoken to Prasad at group home.  placidok confirmed that patients mode of transportation is staff van and that pt will travel with the staff.  Clinical provider is in agreement with staff van.
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
## 2020-01-13 NOTE — ED PROVIDER NOTE - CARDIAC, MLM
REVIEW OF SYSTEMS:  CONSTITUTIONAL: No fever, weight loss, or fatigue  EYES: No eye pain, visual disturbances, or discharge  ENMT:  No difficulty hearing, tinnitus, vertigo; No sinus or throat pain  NECK: No pain or stiffness  BREASTS: post op pain, no masses, or nipple discharge  RESPIRATORY: No cough, wheezing, chills or hemoptysis; No shortness of breath  CARDIOVASCULAR: No chest pain, palpitations, dizziness, or leg swelling  GASTROINTESTINAL: No abdominal or epigastric pain. + vomiting, or hematemesis; No diarrhea or constipation. No melena or hematochezia.  GENITOURINARY: No dysuria, frequency, hematuria, or incontinence  NEUROLOGICAL: No headaches, memory loss, loss of strength, numbness, or tremors  SKIN: No itching, burning, rashes, or lesions   LYMPH NODES: No enlarged glands  ENDOCRINE: No heat or cold intolerance; No hair loss  MUSCULOSKELETAL: No joint pain or swelling; No muscle, back, or extremity pain  PSYCHIATRIC: No depression, anxiety, mood swings, or difficulty sleeping  HEME/LYMPH: No easy bruising, or bleeding gums  ALLERGY AND IMMUNOLOGIC: No hives or eczema Normal rate, regular rhythm.  Heart sounds S1, S2.  No murmurs, rubs or gallops.
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
## 2020-01-13 NOTE — ED PROVIDER NOTE - PATIENT PORTAL LINK FT
You can access the FollowMyHealth Patient Portal offered by [facility] by registering at the following website: [email or web address]. By joining [employer]’s FollowMyHealth portal, you will also be able to view your health information using other applications (apps) compatible with our system.
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
## 2020-01-14 NOTE — ED ADULT TRIAGE NOTE - AS O2 DELIVERY
room air
Patient Name: WILLIAN ISRAEL  MRN: [id number]  31y Male  [facility]    Post Operative Instructions  Please see wound care and activity instructions as documented above.    Pain medication prescribed:   Percocet 5/325 oral tablet: 1 tab(s) orally every 6 hours, As Needed -for severe pain MDD:4 tablets     - Please take pain medications prescribed only as needed for severe pain, otherwise you may take Tylenol, 650mg every 6 hours as needed and/or Motrin, 600mg every 6 hours as needed for mild pain control    Additional Instructions:  Please avoid straining with bowel movements, drink plenty of fluids, and you may purchase over the counter stool softeners such as Colace and Senna.  Please call the clinic and confirm your appointment for follow up, see the follow up instructions and the physician's office number  below. Please call the clinic for any questions or concerns.    12-19-18 @ 09:47
unable to assess

## 2020-01-30 NOTE — ED ADULT TRIAGE NOTE - HEART RATE (BEATS/MIN)
January 30, 2020     Patient: Monisha Sullivan   YOB: 2012   Date of Visit: 1/30/2020       To Whom it May Concern:    Monisha Sullivan was seen in my clinic on 1/30/2020 at 12:30 pm.     Please excuse Kim Osullivansom for his absence from school on the date listed above to be able to make his appointment. Please excuse him from school on 1/27/20-1/31/20. Sincerely,         Sushil Frazier, DO    Medical information is confidential and cannot be disclosed without the written consent of the patient or his representative. 110

## 2021-02-25 ENCOUNTER — TRANSCRIPTION ENCOUNTER (OUTPATIENT)
Age: 26
End: 2021-02-25

## 2021-03-01 ENCOUNTER — TRANSCRIPTION ENCOUNTER (OUTPATIENT)
Age: 26
End: 2021-03-01

## 2021-06-20 ENCOUNTER — EMERGENCY (EMERGENCY)
Facility: HOSPITAL | Age: 26
LOS: 1 days | Discharge: HOME | End: 2021-06-20
Admitting: EMERGENCY MEDICINE
Payer: MEDICAID

## 2021-06-20 VITALS
OXYGEN SATURATION: 97 % | RESPIRATION RATE: 18 BRPM | TEMPERATURE: 99 F | HEART RATE: 120 BPM | SYSTOLIC BLOOD PRESSURE: 141 MMHG | DIASTOLIC BLOOD PRESSURE: 83 MMHG

## 2021-06-20 PROCEDURE — 99284 EMERGENCY DEPT VISIT MOD MDM: CPT

## 2021-06-20 RX ORDER — DIPHENHYDRAMINE HCL 50 MG
50 CAPSULE ORAL ONCE
Refills: 0 | Status: COMPLETED | OUTPATIENT
Start: 2021-06-20 | End: 2021-06-20

## 2021-06-20 RX ORDER — HALOPERIDOL DECANOATE 100 MG/ML
5 INJECTION INTRAMUSCULAR ONCE
Refills: 0 | Status: COMPLETED | OUTPATIENT
Start: 2021-06-20 | End: 2021-06-20

## 2021-06-20 RX ORDER — CHLORPROMAZINE HCL 10 MG
50 TABLET ORAL ONCE
Refills: 0 | Status: COMPLETED | OUTPATIENT
Start: 2021-06-20 | End: 2021-06-20

## 2021-06-20 RX ADMIN — Medication 2 MILLIGRAM(S): at 15:14

## 2021-06-20 RX ADMIN — Medication 50 MILLIGRAM(S): at 15:05

## 2021-06-20 RX ADMIN — HALOPERIDOL DECANOATE 5 MILLIGRAM(S): 100 INJECTION INTRAMUSCULAR at 15:14

## 2021-06-20 NOTE — ED PROVIDER NOTE - CROS ED ROS STATEMENT
all other ROS negative except as per HPI
You can access the FollowMyHealth Patient Portal offered by [facility] by registering at the following website: [email or web address]. By joining [employer]’s FollowMyHealth portal, you will also be able to view your health information using other applications (apps) compatible with our system.

## 2021-06-20 NOTE — ED PROVIDER NOTE - IV ALTEPLASE DOOR HIDDEN
Discussed the importance of blood sugar and blood pressure control and keeping the HA1C < 6.5%. show
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
## 2021-06-20 NOTE — ED BEHAVIORAL HEALTH NOTE - BEHAVIORAL HEALTH NOTE
SW attempted to contact pt's group home @ [phone] for pt disposition and coordination as was advised pt to be discharged back home, called several times no response, no voicemail

## 2021-06-20 NOTE — ED PROVIDER NOTE - PATIENT PORTAL LINK FT
You can access the FollowMyHealth Patient Portal offered by [facility] by registering at the following website: [email or web address]. By joining [facility]’s FollowMyHealth portal, you will also be able to view your health information using other applications (apps) compatible with our system.
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
## 2022-03-03 ENCOUNTER — EMERGENCY (EMERGENCY)
Facility: HOSPITAL | Age: 27
LOS: 1 days | Discharge: HOME | End: 2022-03-03
Attending: EMERGENCY MEDICINE
Payer: MEDICAID

## 2022-03-03 ENCOUNTER — EMERGENCY (EMERGENCY)
Facility: HOSPITAL | Age: 27
LOS: 1 days | Discharge: HOME | End: 2022-03-03
Attending: STUDENT IN AN ORGANIZED HEALTH CARE EDUCATION/TRAINING PROGRAM | Admitting: STUDENT IN AN ORGANIZED HEALTH CARE EDUCATION/TRAINING PROGRAM
Payer: MEDICAID

## 2022-03-03 VITALS
OXYGEN SATURATION: 98 % | DIASTOLIC BLOOD PRESSURE: 83 MMHG | TEMPERATURE: 98 F | SYSTOLIC BLOOD PRESSURE: 141 MMHG | RESPIRATION RATE: 18 BRPM | HEART RATE: 94 BPM

## 2022-03-03 VITALS
OXYGEN SATURATION: 100 % | RESPIRATION RATE: 18 BRPM | HEART RATE: 80 BPM | SYSTOLIC BLOOD PRESSURE: 126 MMHG | DIASTOLIC BLOOD PRESSURE: 71 MMHG | TEMPERATURE: 99 F

## 2022-03-03 VITALS
WEIGHT: 149.91 LBS | RESPIRATION RATE: 18 BRPM | SYSTOLIC BLOOD PRESSURE: 132 MMHG | OXYGEN SATURATION: 100 % | HEART RATE: 93 BPM | DIASTOLIC BLOOD PRESSURE: 83 MMHG | HEIGHT: 68 IN | TEMPERATURE: 98 F

## 2022-03-03 VITALS
RESPIRATION RATE: 16 BRPM | HEART RATE: 76 BPM | DIASTOLIC BLOOD PRESSURE: 75 MMHG | TEMPERATURE: 98 F | OXYGEN SATURATION: 100 % | SYSTOLIC BLOOD PRESSURE: 122 MMHG

## 2022-03-03 PROCEDURE — 99283 EMERGENCY DEPT VISIT LOW MDM: CPT

## 2022-03-03 PROCEDURE — 99281 EMR DPT VST MAYX REQ PHY/QHP: CPT

## 2022-03-03 RX ORDER — CLONAZEPAM 1 MG
1.5 TABLET ORAL ONCE
Refills: 0 | Status: DISCONTINUED | OUTPATIENT
Start: 2022-03-03 | End: 2022-03-03

## 2022-03-03 RX ADMIN — Medication 1.5 MILLIGRAM(S): at 07:58

## 2022-03-03 RX ADMIN — Medication 1.5 MILLIGRAM(S): at 22:19

## 2022-03-03 NOTE — ED PROVIDER NOTE - PATIENT PORTAL LINK FT
You can access the FollowMyHealth Patient Portal offered by [facility] by registering at the following website: [email or web address]. By joining [employer]’s FollowMyHealth portal, you will also be able to view your health information using other applications (apps) compatible with our system.

## 2022-03-03 NOTE — ED PROVIDER NOTE - OBJECTIVE STATEMENT
26 year old male with PMH of Autism, and Impulse Control disorder presents to the ED accompanied by staff at residence for medication administration. As per staff member, pt takes Klonopin 1.5mg, there is a prescription for it but there was no nurse to authorize it so the staff member could administer it. Per staff member, pt has been acting at baseline and has had no somatic complaints. Per pt, when asked about pain he points to belly. Denies vomiting, diarrhea, melena, hematochezia, fevers and chills. Denies any other complaints.

## 2022-03-03 NOTE — ED ADULT TRIAGE NOTE - CHIEF COMPLAINT QUOTE
Pt arrives With Staff from Family Residence according to Staff" he needs to get his medications he could not get it at the group home because there was no Script and they can't give medication without a script." Meds Klonopin, Biocompete, Multivitamins Pt st" I have belly pain...alittle bit." Hx of Anxiety , Developementally disabled.

## 2022-03-03 NOTE — ED PROVIDER NOTE - PROGRESS NOTE DETAILS
Jamie Ritter MD. spoke w/ pt's  Jolene; because they are a group home, they cannot administer medications without a prescription. pt normally takes klonopin 1.5 mg bid but they do not have the prescription because reportedly, the mother is not providing the group home with the prescription. they are aware that it is not feasible for pt to continually report back and forth to the ED for his home medications. will provide his home klonopin here and dc. spoke w/ jolene as well to update plan of care

## 2022-03-03 NOTE — ED PROVIDER NOTE - NS ED ROS FT
Constitutional: no fevers; no chills  HEENT: no visual changes, no sore throat, no rhinorrhea  CV: no cp; no palpitations  Resp: no sob; no cough  GI: no abd pain, no nausea, no vomiting, no diarrhea, no constipation  : no dysuria, no hematuria  MSK: no myalgias; no arthralgias  skin: no rashes  neuro: no HA, no numbness; no weakness, no tingling  endo: no polyuria, no polydipsia

## 2022-03-03 NOTE — ED ADULT TRIAGE NOTE - CHIEF COMPLAINT QUOTE
pt is here from group home; states he needs dose of Klonopin for 8 pm, but resident supervisor states they cannot provide med because they don't have the prescription; went to [facility] at 7am this morning for same reason; pt is nonverbal and autistic

## 2022-03-03 NOTE — ED PROVIDER NOTE - CLINICAL SUMMARY MEDICAL DECISION MAKING FREE TEXT BOX
Jamie Ritter MD. pt presents for his home medication klonopin 1.5 mg. pt is at his baseline. no complaints. exam benign. pt hd stable. spoke w/ alton, group home's manager for long term plan regarding his prescription (see progress note and f/u instructions below). will provide home med and plan for dc. the staff member at bedside will bring pt back

## 2022-03-03 NOTE — ED PROVIDER NOTE - NSFOLLOWUPINSTRUCTIONS_ED_ALL_ED_FT
Mr. Cornelius Almaguer was evaluated in the emergency room today at [facility] (9:30PM, Thursday March 3, 2022) and was give his home medication of Klonopin 1.5 milligrams orally once.    This is Mr. Cornelius Almaguer's second visit to the emergency room today (3/3/2022) to get his home medication because he does not have the prescription for his Klonopin, which the patient's group home has been attempting to get from the patient's mother. Mr. Almaguer's mother is reportedly not returning the group home's phone calls for the prescription.    It is not feasible for Mr. Almaguer to have to keep going to the emergency room to get his home medications which are his normal, home prescriptions.    -Jamie Ritter MD

## 2022-03-03 NOTE — ED PROVIDER NOTE - PATIENT PORTAL LINK FT
You can access the FollowMyHealth Patient Portal offered by [facility] by registering at the following website: [email or web address]. By joining [employer]’s FollowMyHealth portal, you will also be able to view your health information using other applications (apps) compatible with our system.

## 2022-03-03 NOTE — ED ADULT NURSE NOTE - OBJECTIVE STATEMENT
25 y/o male from , here for med refill, was seen earlier in [facility] for same reason, med rx sent to his mother, but mother did not bring meds to the , pt. is w/ a  staff, per staff pt. is non-verbal @ baseline. As per ED attending, rx was sent to pt's pharmacy and  staff is aware of it

## 2022-03-03 NOTE — ED ADULT NURSE NOTE - OBJECTIVE STATEMENT
Pt. is a 26 y.o male who presents to [place] [id number] needing a refill on medications. Pt. w/ hx of Autism, comes from group home- aid at bedside. Per aid, pt. needing refill on daily medications and was told to take pt. to ER for rx. Pt. c/o abdominal pain in triage, no longer endorsing abdominal pain. Abdomen soft, not distended, non-tender. Respirations equal and unlabored b/l. No signs of acute distress. PO challenge to be completed. Comfort measures provided, safety measures implemented.

## 2022-03-03 NOTE — ED PROVIDER NOTE - NSFOLLOWUPINSTRUCTIONS_ED_ALL_ED_FT
You have been given Klonopin 1.5mg during your visit today.    Follow up with your PMD within 48-72 hrs. Show copies of your reports given to you. Take all of your medications as previously prescribed.    If you have any new, worsened, or concerning symptoms, please return to the emergency department immediately.

## 2022-03-03 NOTE — ED PROVIDER NOTE - ATTENDING CONTRIBUTION TO CARE
Manolo Suarez DO:  patient seen and evaluated with the PA.  I was present for key portions of the History & Physical, and I agree with the Impression & Plan. 27 yo m pmh Autism, and Impulse Control disorder, from facility presents for meds. pt received refill of meds, however does not have active script, unable to have given at facility. on klonopin 1.5 mg, visualized script. pt reported abd pain however having BM, passing gas, no hx abd surgery. denies n/v, cp, sob, cough, congestion. arrives hds, well appearing. NO abd ttp in all quadrants. will po challenge. give meds, reassess.

## 2022-03-03 NOTE — ED PROVIDER NOTE - PROGRESS NOTE DETAILS
SCOTT Alvarado: Pt ate and tolerated oral intake well. Given Klonopin. Stable for dc home. PMD follow up. Strict return precautions.

## 2022-03-03 NOTE — ED PROVIDER NOTE - OBJECTIVE STATEMENT
No 25 yo M PMhx autism, impulse control d/o, from [facility], accompanied by ED staff, for his dose of Klonopin 1.5 milligrams (reviewed paper work that staff member has). He was similarly seen at [facility] ED earlier this morning. Pt has no complaints. He is at his baseline mentation.  Spoke with Jolene, the [facility]'s manager, and there is an issue with the pt's prescription for Klonopin that they are trying to resolve with the pt's mother.

## 2022-03-03 NOTE — ED PROVIDER NOTE - CLINICAL SUMMARY MEDICAL DECISION MAKING FREE TEXT BOX
26 year old male with PMH of Autism, and Impulse Control disorder presents to the ED accompanied by staff at residence for medication administration. HD stable. Abdomen soft nontender. Imp: Medication administration, non-acute abdomen. Plan for to give Klonopin and PO challenge, reassess.

## 2022-03-03 NOTE — ED PROVIDER NOTE - PHYSICAL EXAMINATION
PHYSICAL EXAM:  GENERAL: non-toxic appearing; in no respiratory distress  HEAD Atraumatic, Normocephalic  NECK: No JVD; trachea midline  EYES: PERRL, EOMs intact b/l w/out deficits; normal conjunctiva  CHEST/LUNG: CTAB no wheezes/rhonchi/rales  HEART: RRR no murmur/gallops/rubs  ABDOMEN: +BS, soft, NT, ND  EXTREMITIES: No LE edema, +2 radial pulses b/l, +2 DP/PT pulses b/l  MUSCULOSKELETAL: FROM of all 4 extremities;   NERVOUS SYSTEM:  awake, alert, answers questions  SKIN:  Warm and dry as visualized

## 2022-06-06 ENCOUNTER — EMERGENCY (EMERGENCY)
Facility: HOSPITAL | Age: 27
LOS: 0 days | Discharge: HOME | End: 2022-06-06
Attending: EMERGENCY MEDICINE
Payer: MEDICAID

## 2022-06-06 VITALS
TEMPERATURE: 98 F | DIASTOLIC BLOOD PRESSURE: 78 MMHG | SYSTOLIC BLOOD PRESSURE: 120 MMHG | OXYGEN SATURATION: 98 % | RESPIRATION RATE: 18 BRPM | HEART RATE: 70 BPM

## 2022-06-06 VITALS
HEIGHT: 69 IN | RESPIRATION RATE: 18 BRPM | OXYGEN SATURATION: 97 % | HEART RATE: 74 BPM | DIASTOLIC BLOOD PRESSURE: 75 MMHG | SYSTOLIC BLOOD PRESSURE: 117 MMHG | TEMPERATURE: 98 F | WEIGHT: 175.05 LBS

## 2022-06-06 DIAGNOSIS — R21 RASH AND OTHER NONSPECIFIC SKIN ERUPTION: ICD-10-CM

## 2022-06-06 PROCEDURE — 99283 EMERGENCY DEPT VISIT LOW MDM: CPT

## 2022-06-06 RX ORDER — PERMETHRIN CREAM 5% W/W 50 MG/G
1 CREAM TOPICAL
Qty: 2 | Refills: 0
Start: 2022-06-06

## 2022-06-06 RX ORDER — HYDROCORTISONE 1 %
1 OINTMENT (GRAM) TOPICAL
Qty: 1 | Refills: 0
Start: 2022-06-06 | End: 2022-06-12

## 2022-06-06 NOTE — ED PROVIDER NOTE - PATIENT PORTAL LINK FT
You can access the FollowMyHealth Patient Portal offered by [facility] by registering at the following website: [email or web address]. By joining [employer]’s FollowMyHealth portal, you will also be able to view your health information using other applications (apps) compatible with our system.

## 2022-06-06 NOTE — ED PROVIDER NOTE - CLINICAL SUMMARY MEDICAL DECISION MAKING FREE TEXT BOX
rash appearing like scabies. could also be contact dermatitis. start permetherin and hydrocortisone.

## 2022-06-06 NOTE — ED ADULT NURSE NOTE - CHIEF COMPLAINT QUOTE
Patient is non verbal accompanied by aide from group [place]. Patient returned to group home after a home visit and bruising was noticed on bilateral upper arms above biceps.

## 2022-06-06 NOTE — ED PROVIDER NOTE - PHYSICAL EXAMINATION
Gen: Alert, NAD  Head: NC, AT   Eyes: PERRL, EOMI, normal lids/conjunctiva  ENT: normal hearing, patent oropharynx without erythema/exudate, uvula midline  Neck: supple, no tenderness, Trachea midline  Pulm: Bilateral BS, normal resp effort, no wheeze/stridor/retractions  CV: RRR, no M/R/G, 2+ radial and dp pulses bl, no edema  Abd: soft, NT/ND, +BS, no hepatosplenomegaly  Mskel: extremities x4 with normal ROM and no joint effusions. no ctl spine ttp.   Skin: papular rash along the bl upper arms, left greater than right with small ulcers. appears pruritic   Neuro: non verbal, alert

## 2022-06-06 NOTE — ED PROVIDER NOTE - NSFOLLOWUPINSTRUCTIONS_ED_ALL_ED_FT
Apply the HYDROCORTISONE to the affected area - this will treat *possible* CONTACT DERMATITIS - something like an allergic reaction.    Apply the PERMETHERIN all over the body and leave on for 14 hours before washing off.   Then do it again in 1 week. This will treat *POSSIBLE* SCABIES.

## 2022-06-06 NOTE — ED ADULT TRIAGE NOTE - CHIEF COMPLAINT QUOTE
Patient is non verbal accompanied by aide from group [place]. Patient returned to group home after a home visit and bruising was noticed on bilateral upper arms above biceps.

## 2022-06-13 ENCOUNTER — NON-APPOINTMENT (OUTPATIENT)
Age: 27
End: 2022-06-13

## 2023-06-09 NOTE — ED ADULT NURSE NOTE - CINV DISCH TEACH PARTICIP
[facility] Counseling:  I discussed with the patient the risks of golimumab including but not limited to myelosuppression, immunosuppression, autoimmune hepatitis, demyelinating diseases, lymphoma, and serious infections.  The patient understands that monitoring is required including a PPD at baseline and must alert us or the primary physician if symptoms of infection or other concerning signs are noted. Caregiver

## 2023-08-31 ENCOUNTER — NON-APPOINTMENT (OUTPATIENT)
Age: 28
End: 2023-08-31

## 2023-09-14 ENCOUNTER — NON-APPOINTMENT (OUTPATIENT)
Age: 28
End: 2023-09-14

## 2023-10-11 ENCOUNTER — NON-APPOINTMENT (OUTPATIENT)
Age: 28
End: 2023-10-11

## 2024-06-13 ENCOUNTER — NON-APPOINTMENT (OUTPATIENT)
Age: 29
End: 2024-06-13

## 2024-07-18 ENCOUNTER — NON-APPOINTMENT (OUTPATIENT)
Age: 29
End: 2024-07-18

## 2024-12-25 PROBLEM — F10.90 ALCOHOL USE: Status: INACTIVE | Noted: 2019-06-11

## 2025-07-01 ENCOUNTER — EMERGENCY (EMERGENCY)
Facility: HOSPITAL | Age: 30
LOS: 0 days | Discharge: HOME | End: 2025-07-01
Attending: STUDENT IN AN ORGANIZED HEALTH CARE EDUCATION/TRAINING PROGRAM
Payer: MEDICAID

## 2025-07-01 VITALS
RESPIRATION RATE: 20 BRPM | TEMPERATURE: 98 F | DIASTOLIC BLOOD PRESSURE: 81 MMHG | HEART RATE: 80 BPM | OXYGEN SATURATION: 95 % | SYSTOLIC BLOOD PRESSURE: 129 MMHG

## 2025-07-01 VITALS — HEART RATE: 90 BPM | OXYGEN SATURATION: 98 %

## 2025-07-01 DIAGNOSIS — F84.0 AUTISTIC DISORDER: ICD-10-CM
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
## 2025-07-01 DIAGNOSIS — S00.83XA CONTUSION OF OTHER PART OF HEAD, INITIAL ENCOUNTER: ICD-10-CM
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
## 2025-07-01 DIAGNOSIS — Z01.89 ENCOUNTER FOR OTHER SPECIFIED SPECIAL EXAMINATIONS: ICD-10-CM

## 2025-07-01 PROCEDURE — 99283 EMERGENCY DEPT VISIT LOW MDM: CPT

## 2025-07-01 RX ORDER — ACETAMINOPHEN 500 MG/5ML
650 LIQUID (ML) ORAL ONCE
Refills: 0 | Status: COMPLETED | OUTPATIENT
Start: 2025-07-01 | End: 2025-07-01

## 2025-07-01 RX ADMIN — Medication 650 MILLIGRAM(S): at 15:54

## 2025-07-01 NOTE — ED PROVIDER NOTE - PATIENT PORTAL LINK FT
You can access the FollowMyHealth Patient Portal offered by [facility] by registering at the following website: [email or web address]. By joining [employer]’s FollowMyHealth portal, you will also be able to view your health information using other applications (apps) compatible with our system.

## 2025-07-01 NOTE — ED PROVIDER NOTE - PHYSICAL EXAMINATION
General appearance: Nontoxic appearing, conversant, afebrile    Eyes: anicteric sclerae, AUBREE, EOMI   HENT: 1cm small hematoma center forehead; oropharynx clear, MMM and no ulcerations, no pharyngeal erythema or exudate   Neck: Trachea midline; Full range of motion, supple   Pulm: CTA bl, normal respiratory effort and no intercostal retractions, normal work of breathing   CV: RRR, No murmurs, rubs, or gallops   Abdomen: Soft, non-tender, non-distended; no guarding or rebound   Extremities: No peripheral edema, no gross deformities, FROM x4   Skin: Dry, normal temperature, turgor and texture; no rash   Psych: Appropriate affect, cooperative

## 2025-07-01 NOTE — ED PROVIDER NOTE - CLINICAL SUMMARY MEDICAL DECISION MAKING FREE TEXT BOX
29yo male with pmh autism, self injurious behavior, presenting with head injury.  Last night he was frustrated related to video games at group home facility.  He started hitting his head on the wall a few times.  No loc, ac use, injuries elsewhere.  Since then he has been acting at baseline, no vomiting, siezure activity, lethargy.  Has tolerate po and otherwise has been at baseline per staff at bedside.  Small hematoma.  Exam otherwise reassuring with intact neuro exam and ambulating here in nad.  Mom is declining any imaging or further evaluation as he has otherwise been fine since the incident.  Agrees to tylenol.  [place] ct head negative, no other injuries noted.  Will dc

## 2025-07-01 NOTE — ED ADULT NURSE NOTE - NSSUHOSCREENINGYN_ED_ALL_ED
[facility] Transitions Follow Up Call    2020    Patient: Carmina Barthel  Patient : 1944   MRN: <[id number]>  Reason for Admission:  Discharge Date: 20 RARS: Readmission Risk Score: 10         Spoke with: [facility] Transitions Subsequent and Final Call    Subsequent and Final Calls  Do you have any ongoing symptoms?:  No  Have your medications changed?:  No  Do you have any questions related to your medications?:  No  Do you currently have any active services?:  Yes  Are you currently active with any services?:  Outpatient/Community Services  Do you have any needs or concerns that I can assist you with?:  No  Care Transitions Interventions  Other Interventions:        States she's doing very well. Reports she had her last PT session today and is getting around with no problem at all. Denies any pain. States her incision is all healed and looks good. Denies questions or concerns at this time. Follow Up  No future appointments.     Hernesto Fountain No - the patient is unable to be screened due to medical condition

## 2025-07-01 NOTE — ED ADULT NURSE NOTE - DOES PATIENT HAVE ADVANCE DIRECTIVE
Per Betzy NP patient needs to be rescheduled to [facility].    Called patient [phone] rescheduled patient to [facility] 7/30. Patient has prep. Updated letter with changes. New case case created.         CANCEL 7/2/21 DR MENJIVAR @AS / LOCATION DATE CHANGE  Received: Today  Kaitlin DELGADO Gastro Procedure Preaut Pool; P 84[facility] Surgery Sched Nurse Msg Pool; Kaitlin Driscoll  Hi     Please review the changes below:     Procedure: COLON EGD     Current date: 7/2  Betzy gi Please cx case   Current time: =   Current facility: \[facility]\""     Rescheduled date: 7/30   Rescheduled time: =   Rescheduled facility: [facility]   If facility changed, new case created?: [id number]       Thanks,   GI Surgery Scheduling Team        No

## 2025-07-01 NOTE — ED ADULT NURSE NOTE - NS ED NURSE LEVEL OF CONSCIOUSNESS SPEECH
Called and spoke with the patient and let her know that she needs to call Dr. Becerra's office and get the Eliquis refilled.    Speaking Coherently

## 2025-07-01 NOTE — ED ADULT TRIAGE NOTE - CHIEF COMPLAINT QUOTE
as per group home aide, pt was "banging his head on the wall" yesterday. pt is calm at triage. as per aide, pt mom is refusing CT.  history of autism and intellectual disability.

## 2025-07-01 NOTE — ED ADULT NURSE NOTE - OBJECTIVE STATEMENT
Patient is a 30 years old male, alert came here from a Group home for an evaluation s/p head banging yesterday. Redness and slight bruising to mid forehead. Denies LOC, dizziness, blurred vision, N&V. PMHX: Autism

## 2025-07-07 NOTE — ED ADULT NURSE NOTE - CAS EDN DISCHARGE ASSESSMENT
Pt coming in for [place] for flu vaccine. Reached out to mom and dad to advise them that we do not have the flu vaccine right now, but will have in middle of October. No answer, left message to call the office back during normal business hours.   Sherrie Rodrigues, RN     Patient baseline mental status

## 2025-08-19 ENCOUNTER — NON-APPOINTMENT (OUTPATIENT)
Age: 30
End: 2025-08-19